# Patient Record
Sex: FEMALE | Race: BLACK OR AFRICAN AMERICAN | Employment: FULL TIME | URBAN - METROPOLITAN AREA
[De-identification: names, ages, dates, MRNs, and addresses within clinical notes are randomized per-mention and may not be internally consistent; named-entity substitution may affect disease eponyms.]

---

## 2022-12-16 ENCOUNTER — HOSPITAL ENCOUNTER (EMERGENCY)
Age: 47
Discharge: HOME | End: 2022-12-16
Attending: EMERGENCY MEDICINE

## 2022-12-16 ENCOUNTER — APPOINTMENT (OUTPATIENT)
Dept: CT IMAGING | Age: 47
End: 2022-12-16

## 2022-12-16 VITALS
WEIGHT: 175 LBS | HEART RATE: 76 BPM | TEMPERATURE: 98.2 F | DIASTOLIC BLOOD PRESSURE: 79 MMHG | HEIGHT: 60 IN | OXYGEN SATURATION: 98 % | BODY MASS INDEX: 34.36 KG/M2 | RESPIRATION RATE: 16 BRPM | SYSTOLIC BLOOD PRESSURE: 141 MMHG

## 2022-12-16 DIAGNOSIS — R10.32 ABDOMINAL PAIN, LEFT LOWER QUADRANT: Primary | ICD-10-CM

## 2022-12-16 LAB
ANION GAP SERPL CALC-SCNC: 3 MMOL/L (ref 2–11)
BASOPHILS # BLD: 0.1 K/UL (ref 0–0.2)
BASOPHILS NFR BLD: 1 % (ref 0–2)
BUN SERPL-MCNC: 17 MG/DL (ref 6–23)
CALCIUM SERPL-MCNC: 8.4 MG/DL (ref 8.3–10.4)
CHLORIDE SERPL-SCNC: 114 MMOL/L (ref 101–110)
CO2 SERPL-SCNC: 26 MMOL/L (ref 21–32)
CREAT SERPL-MCNC: 0.9 MG/DL (ref 0.6–1)
DIFFERENTIAL METHOD BLD: ABNORMAL
EOSINOPHIL # BLD: 0.2 K/UL (ref 0–0.8)
EOSINOPHIL NFR BLD: 3 % (ref 0.5–7.8)
ERYTHROCYTE [DISTWIDTH] IN BLOOD BY AUTOMATED COUNT: 22.6 % (ref 11.9–14.6)
GLUCOSE SERPL-MCNC: 98 MG/DL (ref 65–100)
HCT VFR BLD AUTO: 30.2 % (ref 35.8–46.3)
HGB BLD-MCNC: 9.2 G/DL (ref 11.7–15.4)
IMM GRANULOCYTES # BLD AUTO: 0 K/UL (ref 0–0.5)
IMM GRANULOCYTES NFR BLD AUTO: 1 % (ref 0–5)
LYMPHOCYTES # BLD: 2.3 K/UL (ref 0.5–4.6)
LYMPHOCYTES NFR BLD: 33 % (ref 13–44)
MCH RBC QN AUTO: 17.1 PG (ref 26.1–32.9)
MCHC RBC AUTO-ENTMCNC: 30.5 G/DL (ref 31.4–35)
MCV RBC AUTO: 56.2 FL (ref 82–102)
MONOCYTES # BLD: 0.6 K/UL (ref 0.1–1.3)
MONOCYTES NFR BLD: 8 % (ref 4–12)
NEUTS SEG # BLD: 3.8 K/UL (ref 1.7–8.2)
NEUTS SEG NFR BLD: 55 % (ref 43–78)
NRBC # BLD: 0 K/UL (ref 0–0.2)
PLATELET # BLD AUTO: 332 K/UL (ref 150–450)
PMV BLD AUTO: ABNORMAL FL (ref 9.4–12.3)
POTASSIUM SERPL-SCNC: 3.8 MMOL/L (ref 3.5–5.1)
RBC # BLD AUTO: 5.37 M/UL (ref 4.05–5.2)
SODIUM SERPL-SCNC: 143 MMOL/L (ref 133–143)
WBC # BLD AUTO: 6.9 K/UL (ref 4.3–11.1)

## 2022-12-16 PROCEDURE — 74176 CT ABD & PELVIS W/O CONTRAST: CPT

## 2022-12-16 PROCEDURE — 80048 BASIC METABOLIC PNL TOTAL CA: CPT

## 2022-12-16 PROCEDURE — 85025 COMPLETE CBC W/AUTO DIFF WBC: CPT

## 2022-12-16 PROCEDURE — 2580000003 HC RX 258: Performed by: EMERGENCY MEDICINE

## 2022-12-16 PROCEDURE — 99285 EMERGENCY DEPT VISIT HI MDM: CPT | Performed by: EMERGENCY MEDICINE

## 2022-12-16 PROCEDURE — 6360000004 HC RX CONTRAST MEDICATION: Performed by: EMERGENCY MEDICINE

## 2022-12-16 RX ORDER — DOCUSATE SODIUM 100 MG/1
100 CAPSULE, LIQUID FILLED ORAL 2 TIMES DAILY
Qty: 10 CAPSULE | Refills: 0 | Status: SHIPPED | OUTPATIENT
Start: 2022-12-16 | End: 2022-12-21

## 2022-12-16 RX ORDER — 0.9 % SODIUM CHLORIDE 0.9 %
1000 INTRAVENOUS SOLUTION INTRAVENOUS ONCE
Status: COMPLETED | OUTPATIENT
Start: 2022-12-16 | End: 2022-12-16

## 2022-12-16 RX ORDER — ONDANSETRON 4 MG/1
4 TABLET, ORALLY DISINTEGRATING ORAL 3 TIMES DAILY PRN
Qty: 9 TABLET | Refills: 0 | Status: SHIPPED | OUTPATIENT
Start: 2022-12-16 | End: 2022-12-19

## 2022-12-16 RX ADMIN — SODIUM CHLORIDE 1000 ML: 900 INJECTION, SOLUTION INTRAVENOUS at 13:37

## 2022-12-16 RX ADMIN — DIATRIZOATE MEGLUMINE AND DIATRIZOATE SODIUM 15 ML: 660; 100 LIQUID ORAL; RECTAL at 13:37

## 2022-12-16 ASSESSMENT — PAIN DESCRIPTION - LOCATION: LOCATION: ABDOMEN

## 2022-12-16 ASSESSMENT — PAIN DESCRIPTION - FREQUENCY: FREQUENCY: CONTINUOUS

## 2022-12-16 ASSESSMENT — ENCOUNTER SYMPTOMS
COUGH: 0
COLOR CHANGE: 0
SHORTNESS OF BREATH: 0
VOMITING: 0
SORE THROAT: 0
ABDOMINAL PAIN: 1
DIARRHEA: 0
NAUSEA: 0
WHEEZING: 0
EYE REDNESS: 0

## 2022-12-16 ASSESSMENT — PAIN - FUNCTIONAL ASSESSMENT: PAIN_FUNCTIONAL_ASSESSMENT: 0-10

## 2022-12-16 ASSESSMENT — PAIN SCALES - GENERAL: PAINLEVEL_OUTOF10: 6

## 2022-12-16 ASSESSMENT — PAIN DESCRIPTION - DESCRIPTORS: DESCRIPTORS: DISCOMFORT

## 2022-12-16 ASSESSMENT — PAIN DESCRIPTION - PAIN TYPE: TYPE: ACUTE PAIN

## 2022-12-16 ASSESSMENT — PAIN DESCRIPTION - ORIENTATION: ORIENTATION: LEFT;LOWER

## 2022-12-16 NOTE — Clinical Note
Lindsey Husain was seen and treated in our emergency department on 12/16/2022. She may return to work on 12/19/2022. If you have any questions or concerns, please don't hesitate to call.       Yaya Giang MD

## 2022-12-16 NOTE — ED NOTES
I have reviewed discharge instructions with the patient. The patient verbalized understanding. Patient left ED via Discharge Method: ambulatory to Home with self. Opportunity for questions and clarification provided. Patient given 2 scripts. To continue your aftercare when you leave the hospital, you may receive an automated call from our care team to check in on how you are doing. This is a free service and part of our promise to provide the best care and service to meet your aftercare needs.  If you have questions, or wish to unsubscribe from this service please call [phone]. Thank you for Choosing our [facility] Emergency Department.         Carlie Díaz RN  12/16/22 7215

## 2022-12-16 NOTE — ED PROVIDER NOTES
Emergency Department Provider Note                   PCP:                No primary care provider on file. Age: 56 y.o. Sex: female     No diagnosis found. DISPOSITION          MDM  Number of Diagnoses or Management Options  Abdominal pain, left lower quadrant  Diagnosis management comments: Her history of gastric sleeve surgery and her abdominal pain I will get a CT scan to look for diverticulitis or perhaps internal hernia. Also given her uterine history we will see if there is potentially anything going on in her uterus. She declined wanting any pain medicine. CT scan is unremarkable. I do not find thing urgent or emergent. I will discharge her home with some Colace and some Zofran. I will refer her to GI for consideration of a colonoscopy. ED Course as of 12/16/22 1530   Fri Dec 16, 2022   1417 Her hemoglobin to 9.2 is consistent with what it was in October when she was admitted after her uterine bleeding. [AC]      ED Course User Index  [AC] Nahomi Sorto MD        Orders Placed This Encounter   Procedures    CT ABDOMEN PELVIS W IV CONTRAST Additional Contrast? None    CBC with Auto Differential    Basic Metabolic Panel    Urinalysis    Pregnancy, Urine    Saline lock IV        Medications   0.9 % sodium chloride bolus (has no administration in time range)       New Prescriptions    No medications on file        Dionicio Ferrell is a 56 y.o. female who presents to the Emergency Department with chief complaint of    Chief Complaint   Patient presents with    Abdominal Pain      79-year-old lady presents with concerns about pain in her left lower abdomen. She says for about the last week he was out of all over her abdomen but now it is localized to the left lower quadrant. She notes back in October she had a fairly severe uterine bleed. Patient denies any other symptoms with this.   She has had no fevers or chills and no nausea, vomiting, or diarrhea    She has never had a colonoscopy and has no known history of diverticulosis or diverticulitis. She has had prior gastric sleeve surgery. Elements of this note were created using speech recognition software. As such, errors of speech recognition may be present. Review of Systems   Constitutional:  Negative for activity change, chills and fever. HENT:  Negative for congestion and sore throat. Eyes:  Negative for redness and visual disturbance. Respiratory:  Negative for cough, shortness of breath and wheezing. Cardiovascular:  Negative for chest pain and palpitations. Gastrointestinal:  Positive for abdominal pain. Negative for diarrhea, nausea and vomiting. Endocrine: Negative for polydipsia and polyuria. Genitourinary:  Negative for flank pain and hematuria. Musculoskeletal:  Negative for joint swelling and myalgias. Skin:  Negative for color change and rash. Allergic/Immunologic: Negative for immunocompromised state. Neurological:  Negative for dizziness, light-headedness and headaches. Hematological:  Negative for adenopathy. Psychiatric/Behavioral:  Negative for confusion. No past medical history on file. No past surgical history on file. No family history on file. Patient has no known allergies. Previous Medications    No medications on file        Vitals signs and nursing note reviewed. No data found. Physical Exam  Vitals and nursing note reviewed. Constitutional:       Appearance: Normal appearance. HENT:      Head: Normocephalic and atraumatic. Mouth/Throat:      Mouth: Mucous membranes are moist.   Eyes:      General:         Right eye: No discharge. Left eye: No discharge. Cardiovascular:      Rate and Rhythm: Normal rate and regular rhythm. Pulses: Normal pulses. Pulmonary:      Effort: Pulmonary effort is normal.      Breath sounds: Normal breath sounds. No wheezing.    Abdominal:      General: Bowel sounds are normal.      Tenderness: There is abdominal tenderness. There is no guarding or rebound. Comments: Mild ttp in LLQ. Musculoskeletal:      Right lower leg: No edema. Left lower leg: No edema. Lymphadenopathy:      Cervical: No cervical adenopathy. Skin:     Coloration: Skin is not jaundiced. Neurological:      General: No focal deficit present. Mental Status: She is alert and oriented to person, place, and time. Mental status is at baseline. Procedures    No results found for any visits on 12/16/22. CT ABDOMEN PELVIS W IV CONTRAST Additional Contrast? None    (Results Pending)                       Voice dictation software was used during the making of this note. This software is not perfect and grammatical and other typographical errors may be present. This note has not been completely proofread for errors.         Kyleigh Lozoya MD  12/16/22 2431

## 2022-12-16 NOTE — ED TRIAGE NOTES
Pt c/o 1 wk abd pain that is now just to LLQ since last night (+)nausea, (-)emesis, diarrhea, fevers, dysuria  Hx hospitalization r/t uterine bleed   A&Ox4

## 2022-12-16 NOTE — Clinical Note
Dahlia Figueroa was seen and treated in our emergency department on 12/16/2022. She may return to work on 12/19/2022. If you have any questions or concerns, please don't hesitate to call.       Livia Malloy MD

## 2023-01-03 ENCOUNTER — HOSPITAL ENCOUNTER (EMERGENCY)
Age: 48
Discharge: HOME | End: 2023-01-03
Attending: EMERGENCY MEDICINE

## 2023-01-03 ENCOUNTER — APPOINTMENT (OUTPATIENT)
Dept: GENERAL RADIOLOGY | Age: 48
End: 2023-01-03

## 2023-01-03 VITALS
SYSTOLIC BLOOD PRESSURE: 136 MMHG | HEART RATE: 78 BPM | OXYGEN SATURATION: 99 % | RESPIRATION RATE: 15 BRPM | DIASTOLIC BLOOD PRESSURE: 93 MMHG | TEMPERATURE: 98.5 F

## 2023-01-03 DIAGNOSIS — U07.1 COVID-19: ICD-10-CM

## 2023-01-03 DIAGNOSIS — R05.1 ACUTE COUGH: Primary | ICD-10-CM

## 2023-01-03 LAB
FLUAV AG NPH QL IA: NEGATIVE
FLUBV AG NPH QL IA: NEGATIVE
SARS-COV-2 RDRP RESP QL NAA+PROBE: DETECTED
SOURCE: ABNORMAL
SPECIMEN SOURCE: NORMAL

## 2023-01-03 PROCEDURE — 87804 INFLUENZA ASSAY W/OPTIC: CPT

## 2023-01-03 PROCEDURE — 99283 EMERGENCY DEPT VISIT LOW MDM: CPT | Performed by: EMERGENCY MEDICINE

## 2023-01-03 PROCEDURE — 6370000000 HC RX 637 (ALT 250 FOR IP): Performed by: EMERGENCY MEDICINE

## 2023-01-03 PROCEDURE — 87635 SARS-COV-2 COVID-19 AMP PRB: CPT

## 2023-01-03 PROCEDURE — 71046 X-RAY EXAM CHEST 2 VIEWS: CPT

## 2023-01-03 RX ORDER — ACETAMINOPHEN 500 MG
1000 TABLET ORAL
Status: COMPLETED | OUTPATIENT
Start: 2023-01-03 | End: 2023-01-03

## 2023-01-03 RX ORDER — BENZONATATE 100 MG/1
100 CAPSULE ORAL 3 TIMES DAILY PRN
Qty: 30 CAPSULE | Refills: 0 | Status: SHIPPED | OUTPATIENT
Start: 2023-01-03 | End: 2023-01-10

## 2023-01-03 RX ADMIN — ACETAMINOPHEN 1000 MG: 500 TABLET ORAL at 10:07

## 2023-01-03 ASSESSMENT — PAIN - FUNCTIONAL ASSESSMENT: PAIN_FUNCTIONAL_ASSESSMENT: 0-10

## 2023-01-03 ASSESSMENT — ENCOUNTER SYMPTOMS: COUGH: 1

## 2023-01-03 ASSESSMENT — PAIN SCALES - GENERAL: PAINLEVEL_OUTOF10: 4

## 2023-01-03 NOTE — ED TRIAGE NOTES
Pt ambulatory to triage. Pt reports productive cough with yellow sputum, nausea, generalized body aches and dry cough, shob on exertion. Pt reports this has been ongoing since Friday. Pt denies chest pain, abd pain, vomiting/diarrhea, headache, runny nose. Pt has tried OTC cold meds such as TheraFlu and Mucinex with mild relief. Pt is vaccinated for flu and Covid. Pt took an at home Covid test on Saturday that was negative.

## 2023-01-03 NOTE — DISCHARGE INSTRUCTIONS
Please alternate tylenol and ibuprofen for pain control at home. Please quarantine for 5 days from onset of symptoms. Cough medicine was prescribed. Return to ER for any worsening symptoms.

## 2023-01-03 NOTE — ED PROVIDER NOTES
Emergency Department Provider Note                   PCP:                None Provider               Age: 56 y.o. Sex: female       ICD-10-CM    1. Acute cough  R05.1       2. COVID-19  U07.1           DISPOSITION Decision To Discharge 01/03/2023 10:05:58 AM        Medical Decision Making  Patient presents for acute flulike symptoms. Vital signs are reviewed. She is in no acute distress. I reviewed her prior emergency department visit and vascular surgery visit. COVID swabs and flu swabs here were obtained as well as chest x-ray. I reviewed these independently myself. COVID swab came back positive flu was negative. Chest x-ray is interpreted by myself and the radiologist as no acute findings. This point patient was given symptomatic COVID relief at home. Patient was given return precautions. Patient stable discharge examination. Amount and/or Complexity of Data Reviewed  Labs: ordered. Radiology: ordered. Risk  OTC drugs. Prescription drug management. Complexity of Problem: 1 acute complicated illness or injury. (4)  The patients assessment required an independent historian: I spoke with a family member. The patients assessment required an independent historian: spouse  I have conducted an independent ordering and review of Labs. I have conducted an independent ordering and review of X-rays. I have reviewed records from an external source: provider visit notes from PCP. Considerations: Shared decision making was utilized in the care of this patient.    Social determinant affecting care: none  Evidence based risk calculation performed: none             Orders Placed This Encounter   Procedures    COVID-19, Rapid    Rapid influenza A/B antigens    XR CHEST (2 VW)        Medications   acetaminophen (TYLENOL) tablet 1,000 mg (1,000 mg Oral Given 1/3/23 1007)       Discharge Medication List as of 1/3/2023 10:17 AM        START taking these medications    Details   benzonatate (TESSALON) 100 MG capsule Take 1 capsule by mouth 3 times daily as needed for Cough, Disp-30 capsule, R-0Print              Jabier Marshall is a 56 y.o. female who presents to the Emergency Department with chief complaint of    Chief Complaint   Patient presents with    Cough      56 yr old female presents to the ER with cc of bodyaches and cough. She reports symptoms for the past 4 days. She denies any associated symptoms. Symptoms have been constant in time. She has taken otc mucinex with no relief. Review of Systems   Respiratory:  Positive for cough. Musculoskeletal:  Positive for myalgias. Past Medical History:   Diagnosis Date    Asthma         No past surgical history on file. No family history on file. Social History     Socioeconomic History    Marital status:    Tobacco Use    Smoking status: Every Day     Types: Cigarettes    Smokeless tobacco: Never   Substance and Sexual Activity    Alcohol use: Not Currently    Drug use: Never         Sesame oil, Aspirin, Shellfish-derived products, and Percocet [oxycodone-acetaminophen]     Discharge Medication List as of 1/3/2023 10:17 AM           Vitals signs and nursing note reviewed. No data found. Physical Exam     Procedures    Results for orders placed or performed during the hospital encounter of 01/03/23   COVID-19, Rapid    Specimen: Nasopharyngeal   Result Value Ref Range    Source NASAL      SARS-CoV-2, Rapid Detected (AA) NOTD     Rapid influenza A/B antigens    Specimen: Nasal Washing   Result Value Ref Range    Influenza A Ag Negative NEG      Influenza B Ag Negative NEG      Source Nasopharyngeal     XR CHEST (2 VW)    Narrative    Chest X-ray    INDICATION: Productive cough and nausea, shortness of breath    PA and lateral views of the chest were obtained. FINDINGS: The lungs are clear. There are no infiltrates or effusions. The heart  size is normal.  The bony thorax is intact.         Impression    No acute findings in the chest          XR CHEST (2 VW)   Final Result   No acute findings in the chest                             Voice dictation software was used during the making of this note. This software is not perfect and grammatical and other typographical errors may be present. This note has not been completely proofread for errors.      Sammi Kline DO  01/03/23 1608

## 2024-02-19 ENCOUNTER — HOSPITAL ENCOUNTER (EMERGENCY)
Age: 49
Discharge: HOME | End: 2024-02-19
Payer: COMMERCIAL

## 2024-02-19 VITALS
DIASTOLIC BLOOD PRESSURE: 83 MMHG | OXYGEN SATURATION: 100 % | SYSTOLIC BLOOD PRESSURE: 137 MMHG | WEIGHT: 170 LBS | HEIGHT: 60 IN | BODY MASS INDEX: 33.38 KG/M2 | HEART RATE: 50 BPM | RESPIRATION RATE: 18 BRPM | TEMPERATURE: 98.1 F

## 2024-02-19 DIAGNOSIS — H65.91 FLUID LEVEL BEHIND TYMPANIC MEMBRANE OF RIGHT EAR: Primary | ICD-10-CM

## 2024-02-19 PROCEDURE — 99283 EMERGENCY DEPT VISIT LOW MDM: CPT

## 2024-02-19 RX ORDER — CETIRIZINE HYDROCHLORIDE, PSEUDOEPHEDRINE HYDROCHLORIDE 5; 120 MG/1; MG/1
1 TABLET, FILM COATED, EXTENDED RELEASE ORAL 2 TIMES DAILY
Qty: 14 TABLET | Refills: 0 | Status: SHIPPED | OUTPATIENT
Start: 2024-02-19 | End: 2024-02-26

## 2024-02-19 RX ORDER — FLUTICASONE PROPIONATE 50 MCG
1 SPRAY, SUSPENSION (ML) NASAL DAILY
Qty: 32 G | Refills: 0 | Status: SHIPPED | OUTPATIENT
Start: 2024-02-19

## 2024-02-19 ASSESSMENT — PAIN DESCRIPTION - ORIENTATION: ORIENTATION: RIGHT

## 2024-02-19 ASSESSMENT — PAIN DESCRIPTION - LOCATION: LOCATION: EAR;JAW

## 2024-02-19 ASSESSMENT — LIFESTYLE VARIABLES
HOW MANY STANDARD DRINKS CONTAINING ALCOHOL DO YOU HAVE ON A TYPICAL DAY: PATIENT DOES NOT DRINK
HOW OFTEN DO YOU HAVE A DRINK CONTAINING ALCOHOL: NEVER

## 2024-02-19 ASSESSMENT — PAIN SCALES - GENERAL: PAINLEVEL_OUTOF10: 7

## 2024-02-19 ASSESSMENT — PAIN DESCRIPTION - DESCRIPTORS: DESCRIPTORS: ACHING

## 2024-02-19 NOTE — ED TRIAGE NOTES
Pt reports right ear pain that began 1.5 weeks ago and has since radiated down to her right jaw.  Pt reports pain has increased over time.  Pt reports applying pressure in front of ear or below the eye relives the pain.

## 2024-02-19 NOTE — ED NOTES
I have reviewed discharge instructions with the patient.  The patient verbalized understanding.    Patient left ED via Discharge Method: ambulatory to Home with self.    Opportunity for questions and clarification provided.       Patient given 0 scripts.         To continue your aftercare when you leave the hospital, you may receive an automated call from our care team to check in on how you are doing.  This is a free service and part of our promise to provide the best care and service to meet your aftercare needs.” If you have questions, or wish to unsubscribe from this service please call [phone].  Thank you for Choosing our [facility] Emergency Department.        Bela Velasquez LPN  02/19/24 5896

## 2024-02-19 NOTE — ED PROVIDER NOTES
Emergency Department Provider Note       PCP: Sunita Peng APRN - NP   Age: 48 y.o.   Sex: female     DISPOSITION Decision To Discharge 02/19/2024 01:17:25 PM       ICD-10-CM    1. Fluid level behind tympanic membrane of right ear  H65.91           Medical Decision Making     Complexity of Problems Addressed:  Complexity of Problem: 1 acute, uncomplicated illness or injury.    Data Reviewed and Analyzed:  I independently ordered and reviewed each unique test.             Discussion of management or test interpretation.  Patient is a 48-year-old female presenting with 1 week history of right-sided ear pain and pressure.  No fevers or chills.  She is afebrile nontoxic in appearance, vital signs within appropriate limits.  She does appear to have middle ear effusion however no redness to the TM.  Will discharge home with Zyrtec-D as well as Flonase.  Discussed follow-up as well as reasons return to the ED.  Patient agreeable plan       Risk of Complications and/or Morbidity of Patient Management:          History      Patient is a 48-year-old female who presents with right ear pain that radiates into the jaw area.  It has been ongoing for a week.  She also gets some pressure behind the right sinus.  She states she feels like when she pushes on her face that releases the pressure in her ear feels better.  No drainage from the ear.  No fever or chills.  No cough sore throat or other URI symptoms.  She does report that she has allergies.  She has been taking Tylenol but that does not seem to help with her pain    The history is provided by the patient.        Physical Exam     Vitals signs and nursing note reviewed.   Vitals:    02/19/24 1243   BP: 124/84   Pulse: 53   Resp: 18   Temp: 98.1 °F (36.7 °C)   TempSrc: Oral   SpO2: 100%       Physical Exam  Vitals and nursing note reviewed.   Constitutional:       General: She is not in acute distress.     Appearance: She is not ill-appearing or toxic-appearing.   HENT:

## 2024-03-28 ENCOUNTER — HOSPITAL ENCOUNTER (EMERGENCY)
Age: 49
Discharge: HOME | End: 2024-03-28
Payer: COMMERCIAL

## 2024-03-28 ENCOUNTER — APPOINTMENT (OUTPATIENT)
Dept: GENERAL RADIOLOGY | Age: 49
End: 2024-03-28
Payer: COMMERCIAL

## 2024-03-28 VITALS
TEMPERATURE: 97.9 F | OXYGEN SATURATION: 100 % | HEART RATE: 54 BPM | RESPIRATION RATE: 16 BRPM | DIASTOLIC BLOOD PRESSURE: 73 MMHG | SYSTOLIC BLOOD PRESSURE: 129 MMHG

## 2024-03-28 DIAGNOSIS — M25.512 ACUTE PAIN OF LEFT SHOULDER: Primary | ICD-10-CM

## 2024-03-28 PROCEDURE — 6370000000 HC RX 637 (ALT 250 FOR IP): Performed by: STUDENT IN AN ORGANIZED HEALTH CARE EDUCATION/TRAINING PROGRAM

## 2024-03-28 PROCEDURE — 73030 X-RAY EXAM OF SHOULDER: CPT

## 2024-03-28 PROCEDURE — 99283 EMERGENCY DEPT VISIT LOW MDM: CPT

## 2024-03-28 RX ORDER — MELOXICAM 15 MG/1
15 TABLET ORAL DAILY
Qty: 30 TABLET | Refills: 0 | Status: SHIPPED | OUTPATIENT
Start: 2024-03-28 | End: 2024-04-27

## 2024-03-28 RX ORDER — MELOXICAM 7.5 MG/1
15 TABLET ORAL ONCE
Status: COMPLETED | OUTPATIENT
Start: 2024-03-28 | End: 2024-03-28

## 2024-03-28 RX ADMIN — MELOXICAM 15 MG: 7.5 TABLET ORAL at 08:57

## 2024-03-28 ASSESSMENT — ENCOUNTER SYMPTOMS
TROUBLE SWALLOWING: 0
SHORTNESS OF BREATH: 0
VOMITING: 0
FACIAL SWELLING: 0
ABDOMINAL PAIN: 0
COUGH: 0
PHOTOPHOBIA: 0

## 2024-03-28 ASSESSMENT — PAIN SCALES - GENERAL: PAINLEVEL_OUTOF10: 9

## 2024-03-28 NOTE — ED TRIAGE NOTES
Pt ambulatory to triage c/o left shoulder pain xs 1 week. Pt unsure of how it occurred, denies mechanical injury.

## 2024-03-28 NOTE — DISCHARGE INSTRUCTIONS
Your x-ray did not show any bony injury.  As we discussed, it is likely that you have inflammation in your shoulder.  Take the anti-inflammatory daily to help reduce the inflammation.  Use the exercises given to you to help gently stretch and strengthen your shoulder.  Try to reduce your heavy lifting over the next week or 2 to help your shoulder heal.  Referrals for physical therapy and orthopedics have been placed for you.  You may call them at the number above to set your appointment up.  Return here for new or worsening symptoms    As we discussed, I did not find a life threatening cause of your symptoms today. However, THAT DOES NOT MEAN IT COULD NOT DEVELOP. If you develop ANY new or worsening symptoms, it is critical that you return for re-evaluation. This includes any symptoms that are concerning to you, especially symptoms such as fevers, redness to your shoulder, severe pain with range of motion, chest pain, abdominal pain.  If you do not return for re-evaluation, you risk serious complications, including death.

## 2024-03-28 NOTE — ED PROVIDER NOTES
Emergency Department Provider Note       PCP: Sunita Peng APRN - NP   Age: 48 y.o.   Sex: female     DISPOSITION Discharge - Pending Orders Complete 03/28/2024 08:44:50 AM       ICD-10-CM    1. Acute pain of left shoulder  M25.512 [facility] - Physical Therapy, [facility]     [facility] - [facility]          Medical Decision Making     In summary this is a 48-year-old female patient who presented with left shoulder pain ongoing for the past 1 week.  Differential includes biceps tendinitis vs arthritis vs bursitis.  Low suspicion for more serious causes of diagnosis such as ACS, acute abdominal pathology referred pain, septic joint, DVT, neurovascular disruption, rotator cuff tear, cervical radiculopathy.  Patient has no reported chest pains or shortness of breath or any associated symptoms of ACS.  Do not feel workup indicated.  Her pain is very movement related and local to the shoulder joint.  Her abdominal exam is benign without tenderness.  She does not have any fevers or tachycardia or severe pain with range of motion or swelling or warmth to suggest septic joint.  She has no arm swelling or palpable cords or decreased pulses to suggest DVT or arterial occlusion.  No neck tenderness or neck pain reported or numbness or tingling to suggest radiculopathy.  She has full range of motion making complete rotator cuff tear unlikely.  Plan will be outpatient therapy.  X-ray was normal.  Will refer to physical therapy and orthopedics and have her begin anti-inflammatories.  Counseled on signs to return for.  The patient has verbalized understanding and agreed with the plan.  Discharged in stable condition.     1 acute, uncomplicated illness or injury.  Over the counter drug management performed.  Prescription drug management performed.  Patient was discharged risks and benefits of hospitalization were considered.  Shared medical decision making was utilized in creating the

## 2024-03-28 NOTE — ED NOTES
Patient mobility status  with no difficulty. Provider aware     I have reviewed discharge instructions with the patient.  The patient verbalized understanding.    Patient left ED via Discharge Method: ambulatory to Home     Opportunity for questions and clarification provided.     Patient given 1 scripts.           La Nena Delatorre RN  03/28/24 0853

## 2024-04-01 ENCOUNTER — HOSPITAL ENCOUNTER (OUTPATIENT)
Dept: PHYSICAL THERAPY | Age: 49
Setting detail: RECURRING SERIES
Discharge: HOME | End: 2024-04-04
Payer: COMMERCIAL

## 2024-04-01 DIAGNOSIS — M25.512 ACUTE PAIN OF LEFT SHOULDER: Primary | ICD-10-CM

## 2024-04-01 PROCEDURE — 97140 MANUAL THERAPY 1/> REGIONS: CPT

## 2024-04-01 PROCEDURE — 97110 THERAPEUTIC EXERCISES: CPT

## 2024-04-01 PROCEDURE — 97161 PT EVAL LOW COMPLEX 20 MIN: CPT

## 2024-04-01 NOTE — THERAPY EVALUATION
Stacey Howard COOL  : 1975  Primary: [facility] [facility])  Secondary:  [facility] @ [address] [place] [address]  Phone: [phone]  Fax: [phone] Plan Frequency: 2x/week for 90 days    Plan of Care/Certification Expiration Date: 24        Plan of Care/Certification Expiration Date:  Plan of Care/Certification Expiration Date: 24    Frequency/Duration: Plan Frequency: 2x/week for 90 days      Time In/Out:          PT Visit Info:         Visit Count:  1                OUTPATIENT PHYSICAL THERAPY:             Initial Assessment 2024               Episode (L Shoulder Pain)         Treatment Diagnosis:     Acute pain of left shoulder  Medical/Referring Diagnosis:    Acute pain of left shoulder    Referring Physician:  Jeff Theodore PA MD Orders:  PT Eval and Treat   Return MD Appt:    Date of Onset:    3/25/24  Allergies:  Peanut-containing drug products, Sesame oil, Sesame seed extract allergy skin test, Aspirin, Shellfish allergy, Shellfish-derived products, and Percocet [oxycodone-acetaminophen]  Restrictions/Precautions:    None      Medications Last Reviewed:  2024     SUBJECTIVE   History of Injury/Illness (Reason for Referral):  Pt states about a week ago she started to have left shoulder pain. Pain is located on the lateral aspect of the shoulder and goes to the back of her shoulder. Will occasionally radiate down the front of her arm to the mid bicep. Pain is described as aching/dull, but will be sharp with movement. It is preventing her from sleeping. Has gotten worse over the last week. 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   Patient Stated Goal(s):  \"Decrease pain\"  Initial Pain Level:    8   /10   Post Session Pain Level:     6 /10  Past Medical History/Comorbidities:   Ms. Lawrence  has a past medical history of Asthma.  Ms. Lawrence  has no past surgical

## 2024-04-01 NOTE — PROGRESS NOTES
Staceydavid Lawrence V  : 1975  Primary: [employer] [facility] ([employer]  Secondary:  [facility] @ [facility]  [address] [facility] [place] [address]  Phone: [phone]  Fax: [phone] Plan Frequency: 2x/week for 90 days    Plan of Care/Certification Expiration Date: 24        Plan of Care/Certification Expiration Date:  Plan of Care/Certification Expiration Date: 24    Frequency/Duration:   Plan Frequency: 2x/week for 90 days      Time In/Out:   Time In: 1600  Time Out: 1645      PT Visit Info:         Visit Count:  1    OUTPATIENT PHYSICAL THERAPY:   Treatment Note 2024       Episode  (L Shoulder Pain)               Treatment Diagnosis:    Acute pain of left shoulder  Medical/Referring Diagnosis:    Acute pain of left shoulder    Referring Physician:  Jeff Theodore PA MD Orders:  PT Eval and Treat   Return MD Appt:     Date of Onset:  No data recorded   Allergies:   Peanut-containing drug products, Sesame oil, Sesame seed extract allergy skin test, Aspirin, Shellfish allergy, Shellfish-derived products, and Percocet [oxycodone-acetaminophen]  Restrictions/Precautions:   None      Interventions Planned (Treatment may consist of any combination of the following):     See Assessment Note    Subjective Comments:   See Evaluation Note from today  Initial Pain Level::      /10  Post Session Pain Level:        /10  Medications Last Reviewed:  2024  Updated Objective Findings:  See Evaluation Note from today  Treatment   THERAPEUTIC EXERCISE: (12 minutes):    Exercises per grid below to improve mobility, strength, and coordination.  Required minimal visual, verbal, manual, and tactile cues to promote proper body posture and promote proper body mechanics.  Progressed resistance, range, repetitions, and complexity of movement as indicated.     Date:       Activity/Exercise Parameters     Education Plan of car, prognosis, anatomy, HEP     Thoracic Extension x15

## 2024-04-08 ENCOUNTER — HOSPITAL ENCOUNTER (OUTPATIENT)
Dept: PHYSICAL THERAPY | Age: 49
Setting detail: RECURRING SERIES
Discharge: HOME | End: 2024-04-11
Payer: COMMERCIAL

## 2024-04-08 NOTE — PROGRESS NOTES
Stacey Lawrence V  : 1975  Primary: [facility]  Secondary:  [facility] @ [address]  [place] [address]  Phone: [phone]  Fax: [phone] Plan Frequency: 2x/week for 90 days    Plan of Care/Certification Expiration Date: 24      PT Visit Info:  No data recorded       OUTPATIENT PHYSICAL THERAPY 2024     Appt Desk   Episode   MyChart      Ms. Lawrence cancelled appointment today due to the increase pain in her shoulder and down her arm. She plans to call tomorrow to see if she can get an earlier appointment with orthopedic doctor.       Marva Walter PTA    Future Appointments   Date Time Provider Department Center   4/15/2024  4:00 PM Marva Walter PTA [facility]   2024  4:00 PM Marva Walter PTA [facility]   2024  4:00 PM Marva Walter PTA [facility]   2024 11:15 AM Annie Szymanski MD [facility]

## 2024-04-09 ENCOUNTER — TELEPHONE (OUTPATIENT)
Dept: ORTHOPEDIC SURGERY | Age: 49
End: 2024-04-09

## 2024-04-09 NOTE — TELEPHONE ENCOUNTER
Is there anything sooner she can come in , her ER dr sent to PT  , didn't know what to treat for,  PT told her to call and see if she could get a sooner appt??

## 2024-04-15 ENCOUNTER — HOSPITAL ENCOUNTER (OUTPATIENT)
Dept: PHYSICAL THERAPY | Age: 49
Setting detail: RECURRING SERIES
End: 2024-04-15
Payer: COMMERCIAL

## 2024-04-15 NOTE — PROGRESS NOTES
Stacey Lawrence TRAVON  : 1975  Primary: [facility]  Secondary:  [facility] @ [address]  [facility] [address]  Phone: [phone]  Fax: [phone] Plan Frequency: 2x/week for 90 days    Plan of Care/Certification Expiration Date: 24      PT Visit Info:  No data recorded       OUTPATIENT PHYSICAL THERAPY 4/15/2024     Appt Desk   Episode   MyChart      Ms. Lawrence cancelled appointment because she is scheduled to see the doctor tomorrow.      Marva Walter PTA    Future Appointments   Date Time Provider Department Center   4/15/2024  4:00 PM Marva Walter PTA [facility]   2024  2:00 PM Annie Szymanski MD [facility]   2024  4:00 PM Marva Walter PTA [facility]   2024  4:00 PM Marva Walter PTA [facility]

## 2024-04-16 ENCOUNTER — OFFICE VISIT (OUTPATIENT)
Dept: ORTHOPEDIC SURGERY | Age: 49
End: 2024-04-16
Payer: COMMERCIAL

## 2024-04-16 DIAGNOSIS — M67.912 TENDINOPATHY OF LEFT ROTATOR CUFF: Primary | ICD-10-CM

## 2024-04-16 PROCEDURE — 99203 OFFICE O/P NEW LOW 30 MIN: CPT | Performed by: STUDENT IN AN ORGANIZED HEALTH CARE EDUCATION/TRAINING PROGRAM

## 2024-04-16 RX ORDER — MELOXICAM 7.5 MG/1
7.5 TABLET ORAL 2 TIMES DAILY PRN
Qty: 30 TABLET | Refills: 0 | Status: SHIPPED | OUTPATIENT
Start: 2024-04-16

## 2024-04-16 NOTE — PROGRESS NOTES
Name: Stacey Lawrence V  YOB: 1975  Gender: female  MRN: [id number]  Date of Encounter:  4/16/2024       CHIEF COMPLAINT:     Chief Complaint   Patient presents with    Shoulder Pain     Left        SUBJECTIVE/OBJECTIVE:      HPI:    Stacey Lawrence V  is a 48 y.o. pleasant female who presents today for a new evaluation of her left shoulder pain.    Ms. Lawrence is a [employer] employee who oversees the [employer]. 3 weeks ago she noted atraumatic onset of left shoulder pain after pulling a lot of heavy carts at work. She had gone to the ED for pain and was prescribed Mobic, but returned to work 4 days later and a heavy magnet from a door had fallen and hit her in the left arm. She complains of shooting pain down to her fingers that causes coldness in her arm and aching pain. She went to a few sessions of PT, but put it on pause for now in order to have a diagnosis first. She denies any previous history of injury. She is RHD.      PAST HISTORY:   Past medical, surgical, family, social history and allergies reviewed by me.   Pertinent history:   Tobacco use:  reports that she has been smoking cigarettes. She has never used smokeless tobacco.  Diabetes: none  No results found for: \"LABA1C\"  Anticoagulation: no      REVIEW OF SYSTEMS:   As noted in HPI.     PHYSICAL EXAMINATION:     Gen: Well-developed, no acute distress   HEENT: NC/AT, EOMI   Neck: Trachea midline, normal ROM   CV: Regular rhythm by palpation of distal pulse, normal capillary refill   Pulm: No respiratory distress, no stridor   Psychiatric: Well oriented, normal mood and affect.   Skin: No rashes, lesions or ulcers, normal temperature, turgor, and texture on uninvolved extremity.      ORTHO EXAM:    Left Shoulder:     Inspection: No deformity, No ecchymosis   ROM: Active / passive forward flexion 180 / 180  Active / passive abduction 170 / 170  Internal rotation 60  External rotation 0 abduction 80  Tenderness: tenderness to posterior

## 2024-04-22 ENCOUNTER — HOSPITAL ENCOUNTER (OUTPATIENT)
Dept: PHYSICAL THERAPY | Age: 49
Setting detail: RECURRING SERIES
Discharge: HOME | End: 2024-04-25
Payer: COMMERCIAL

## 2024-04-22 PROCEDURE — 97140 MANUAL THERAPY 1/> REGIONS: CPT

## 2024-04-22 PROCEDURE — 97110 THERAPEUTIC EXERCISES: CPT

## 2024-04-22 NOTE — PROGRESS NOTES
Staceydavid Lawrence V  : 1975  Primary: [employer]  Secondary:  [facility]  [address] [facility] [place] [address]  Phone: [phone]  Fax: [phone] Plan Frequency: 2x/week for 90 days    Plan of Care/Certification Expiration Date: 24        Plan of Care/Certification Expiration Date:  Plan of Care/Certification Expiration Date: 24    Frequency/Duration:   Plan Frequency: 2x/week for 90 days      Time In/Out:   Time In: 0400  Time Out: 0445      PT Visit Info:         Visit Count:  2    OUTPATIENT PHYSICAL THERAPY:   Treatment Note 2024       Episode  (L Shoulder Pain)               Treatment Diagnosis:    No data found  Medical/Referring Diagnosis:    Acute pain of left shoulder    Referring Physician:  Jeff Theodore PA MD Orders:  PT Eval and Treat   Return MD Appt:     Date of Onset:  No data recorded   Allergies:   Peanut-containing drug products, Sesame oil, Sesame seed extract allergy skin test, Aspirin, Shellfish allergy, Shellfish-derived products, and Percocet [oxycodone-acetaminophen]  Restrictions/Precautions:   None      Interventions Planned (Treatment may consist of any combination of the following):     See Assessment Note    Subjective Comments: Patient reports shoulder has been hurting more since working over the weekend. She states she plans to contact the doctor for a shot.    Initial Pain Level::     4 /10  Post Session Pain Level:       3 /10  Medications Last Reviewed:  2024  Updated Objective Findings:  None Today  Treatment   THERAPEUTIC EXERCISE: (15 minutes):    Exercises per grid below to improve mobility, strength, and coordination.  Required minimal visual, verbal, manual, and tactile cues to promote proper body posture and promote proper body mechanics.  Progressed resistance, range, repetitions, and complexity of movement as indicated.     Date:   Date      Activity/Exercise Parameters

## 2024-04-29 ENCOUNTER — HOSPITAL ENCOUNTER (OUTPATIENT)
Dept: PHYSICAL THERAPY | Age: 49
Setting detail: RECURRING SERIES
End: 2024-04-29
Payer: COMMERCIAL

## 2024-04-29 NOTE — PROGRESS NOTES
Staceysheryl Lawrence V  : 1975  Primary: [employer]  Secondary:  [facility] @ [address]  [place] [address]  Phone: [phone]  Fax: [phone] Plan Frequency: 2x/week for 90 days    Plan of Care/Certification Expiration Date: 24      PT Visit Info:  No data recorded       OUTPATIENT PHYSICAL THERAPY 2024     Appt Desk   Episode   MyChart      Ms. Lawrence cancelled appointment due to schedule conflict.      Marva Walter PTA    Future Appointments   Date Time Provider Department Center   2024  4:00 PM Marva Walter PTA [facility]   2024  3:15 PM Marva Walter PTA [facility]   2024  2:15 PM Annie Szymanski MD [facility]